# Patient Record
Sex: FEMALE | URBAN - METROPOLITAN AREA
[De-identification: names, ages, dates, MRNs, and addresses within clinical notes are randomized per-mention and may not be internally consistent; named-entity substitution may affect disease eponyms.]

---

## 2019-11-26 ENCOUNTER — APPOINTMENT (RX ONLY)
Dept: URBAN - METROPOLITAN AREA CLINIC 74 | Facility: CLINIC | Age: 39
Setting detail: DERMATOLOGY
End: 2019-11-26

## 2019-11-26 DIAGNOSIS — L71.8 OTHER ROSACEA: ICD-10-CM

## 2019-11-26 DIAGNOSIS — L81.1 CHLOASMA: ICD-10-CM

## 2019-11-26 PROCEDURE — ? OTHER

## 2019-11-26 PROCEDURE — ? TREATMENT REGIMEN

## 2019-11-26 PROCEDURE — ? COUNSELING

## 2019-11-26 PROCEDURE — ? PRESCRIPTION

## 2019-11-26 PROCEDURE — 99202 OFFICE O/P NEW SF 15 MIN: CPT

## 2019-11-26 PROCEDURE — ? SKIN MEDICINALS

## 2019-11-26 RX ORDER — TRETINOIN 0.25 MG/G
CREAM TOPICAL QHS
Qty: 1 | Refills: 3 | Status: ERX | COMMUNITY
Start: 2019-11-26

## 2019-11-26 RX ADMIN — TRETINOIN: 0.25 CREAM TOPICAL at 00:00

## 2019-11-26 ASSESSMENT — LOCATION SIMPLE DESCRIPTION DERM
LOCATION SIMPLE: INFERIOR FOREHEAD
LOCATION SIMPLE: LEFT CHEEK
LOCATION SIMPLE: RIGHT CHEEK

## 2019-11-26 ASSESSMENT — LOCATION DETAILED DESCRIPTION DERM
LOCATION DETAILED: RIGHT CENTRAL MALAR CHEEK
LOCATION DETAILED: RIGHT MEDIAL MALAR CHEEK
LOCATION DETAILED: LEFT INFERIOR MEDIAL MALAR CHEEK
LOCATION DETAILED: LEFT MEDIAL MALAR CHEEK
LOCATION DETAILED: INFERIOR MID FOREHEAD

## 2019-11-26 ASSESSMENT — LOCATION ZONE DERM: LOCATION ZONE: FACE

## 2019-11-26 NOTE — PROCEDURE: SKIN MEDICINALS
Sig: Apply to affected areas twice daily
Detail Level: Simple
Rosacea Medicines: Azelaic Acid 15%, Ivermectin 1%, Metronidazole 1% Cream
Sig: Apply nightly to warts nightly under occlusion
Sig: Apply pea sized amount per area at night
Sig: Apply a thin layer to affected areas twice daily for 6 weeks
Product Type (1): Rosacea
Sig: Apply twice daily for 5 days
Sig: Apply to affected areas on face twice daily
Intro Statement: I recommended the following products:

## 2019-11-26 NOTE — PROCEDURE: OTHER
Other (Free Text): - pt with mild rosacea on face\\n- Disc tx options: compound cream vs topicals vs retin-a. pt elected to get compound\\n- Will start Azelaic Acid 15%, Ivermectin 1%, Metronidazole 1% Cream\\n- can contact office if pt wants larger bottle\\n-pt denies sensitive skin\\nRTC 8 weeks
Note Text (......Xxx Chief Complaint.): This diagnosis correlates with the
Detail Level: Zone
Other (Free Text): - pt with melasma\\n- will start tretinoin 0.025%. will see if pharmacy covers rx, if not will compound retin-a\\n- Disc adding hyaluronic acid\\nRTC 8 weeks

## 2019-11-26 NOTE — HPI: DRY SKIN
How Severe Is Your Dry Skin?: moderate
Additional History: Pt presents for dry skin located on face x months. Pt reports she has used vitamin C in the past which seemed to help with the dry skin. Pt notes hyaluronic acid has helped once in the past.

## 2019-11-26 NOTE — PROCEDURE: TREATMENT REGIMEN
Detail Level: Zone
Initiate Treatment: Azelaic Acid 15%, Ivermectin 1%, Metronidazole 1% Cream - Apply to affected areas on face twice daily
Otc Regimen: Neutrogena
Initiate Treatment: Tretinoin 0.025% cream - apply pea size amount 3 times a week, then increase to 4 times a week, then 5 times a week, then every day when able to tolerate